# Patient Record
Sex: FEMALE | Race: AMERICAN INDIAN OR ALASKA NATIVE
[De-identification: names, ages, dates, MRNs, and addresses within clinical notes are randomized per-mention and may not be internally consistent; named-entity substitution may affect disease eponyms.]

---

## 2020-02-17 ENCOUNTER — HOSPITAL ENCOUNTER (OUTPATIENT)
Dept: HOSPITAL 5 - PF | Age: 55
Discharge: HOME | End: 2020-02-17
Attending: INTERNAL MEDICINE
Payer: COMMERCIAL

## 2020-02-17 DIAGNOSIS — I10: ICD-10-CM

## 2020-02-17 DIAGNOSIS — M06.9: ICD-10-CM

## 2020-02-17 DIAGNOSIS — E03.9: ICD-10-CM

## 2020-02-17 DIAGNOSIS — J44.9: Primary | ICD-10-CM

## 2020-02-17 DIAGNOSIS — I38: ICD-10-CM

## 2020-02-17 DIAGNOSIS — G62.9: ICD-10-CM

## 2020-02-17 PROCEDURE — 94729 DIFFUSING CAPACITY: CPT

## 2020-02-17 PROCEDURE — 94060 EVALUATION OF WHEEZING: CPT

## 2020-02-17 PROCEDURE — 94640 AIRWAY INHALATION TREATMENT: CPT

## 2020-03-26 ENCOUNTER — HOSPITAL ENCOUNTER (EMERGENCY)
Dept: HOSPITAL 5 - ED | Age: 55
LOS: 1 days | Discharge: HOME | End: 2020-03-27
Payer: SELF-PAY

## 2020-03-26 DIAGNOSIS — J06.9: ICD-10-CM

## 2020-03-26 DIAGNOSIS — Z88.0: ICD-10-CM

## 2020-03-26 DIAGNOSIS — F17.200: ICD-10-CM

## 2020-03-26 DIAGNOSIS — Z91.040: ICD-10-CM

## 2020-03-26 DIAGNOSIS — J45.909: Primary | ICD-10-CM

## 2020-03-26 PROCEDURE — 99283 EMERGENCY DEPT VISIT LOW MDM: CPT

## 2020-03-26 PROCEDURE — 94640 AIRWAY INHALATION TREATMENT: CPT

## 2020-03-26 PROCEDURE — 71046 X-RAY EXAM CHEST 2 VIEWS: CPT

## 2020-03-27 VITALS — SYSTOLIC BLOOD PRESSURE: 128 MMHG | DIASTOLIC BLOOD PRESSURE: 78 MMHG

## 2020-03-27 NOTE — XRAY REPORT
CHEST 2 VIEWS 



INDICATION / CLINICAL INFORMATION:

cough.



COMPARISON: 

None available.



FINDINGS:



SUPPORT DEVICES: None.



HEART / MEDIASTINUM: No significant abnormality. 



LUNGS / PLEURA: No significant pulmonary or pleural abnormality. No pneumothorax. 



ADDITIONAL FINDINGS: No significant additional findings.



IMPRESSION:

1. No acute findings.



Signer Name: JOSAFAT Kessler MD 

Signed: 3/27/2020 1:44 AM

Workstation Name: [id number]

## 2020-03-27 NOTE — EMERGENCY DEPARTMENT REPORT
Minor Respiratory





- HPI


Chief Complaint: Headache


Stated Complaint: SOB,NAUSEA,LIGHT HEADED


Time Seen by Provider: 03/27/20 00:51


Severity: moderate


Minor Respiratory: Yes Able to Tolerate Fluids, Yes Cough, Yes Shortness of 

Breath, No Rhinorrhea, No Sore Throat, No Ear Pain, No Sick Contacts, No 

Hemoptysis, No Chest Pain, No Fever


Other History: This is a 55-year-old female with a history of COPD and asthma 

who presents the ED complaining of intermittent dry and productive coughing for 

the past 2 weeks.  Patient states that symptoms have not gotten better.  Patient

states that she has also been experiencing body aches headaches.  She denies 

fever/chills/nausea vomiting or shortness of breath.  She denies being around 

anyone that is been sick.





ED Review of Systems


ROS: 


Stated complaint: SOB,NAUSEA,LIGHT HEADED


Other details as noted in HPI





Comment: All other systems reviewed and negative





ED Past Medical Hx





- Social History


Smoking Status: Current Every Day Smoker


Substance Use Type: None





- Medications


Home Medications: 


                                Home Medications











 Medication  Instructions  Recorded  Confirmed  Last Taken  Type


 


Acetamin/Codeine 120-12Mg/5 ml 5 ml PO TID #80 ml 03/27/20  Unknown Rx





[Tylenol/Codeine 120-12 mg/5 ml]     


 


Albuterol INH(or & Nicu Only) 2 puff IH QID PRN #8.5 gram 03/27/20  Unknown Rx





[ProAir HFA Inhaler]     


 


Benzonatate [Tessalon Perles] 100 mg PO Q8HR #30 capsule 03/27/20  Unknown Rx














Minor Respiratory Exam





- Exam


General: 


Vital signs noted. No distress. Alert and acting appropriately.





HEENT: Yes Moist Mucous Membranes, No Pharyngeal Erythema, No Pharyngeal 

Exudates, No Rhinorrhea, No Conjuctival Injection, No Frontal Tenderness, No 

Maxillary Tenderness


Ear: Neither TM Bulge, Neither TM Erythema, Neither EAC Pain, Neither EAC 

Discharge


Neck: Yes Supple, No Adenopathy


Lungs: Yes Good Air Exchange, No Wheezes, No Ronchi, No Stridor, No Cough, No 

Labored Respirations, No Retractions, No Use of Accessory Muscles, No Other 

Abnormal Lung Sounds


Heart: Yes Regular, No Murmur


Abdomen: Yes Normal Bowel Sounds, No Tenderness, No Peritoneal Signs


Skin: No Rash, No Edema


Neurologic: 


Alert and oriented, no deficits.








Musculoskeletal: 


Unremarkable.











ED Course


                                   Vital Signs











  03/26/20





  23:51


 


Temperature 98.6 F


 


Pulse Rate 77


 


Respiratory 20





Rate 


 


Blood Pressure 128/78


 


O2 Sat by Pulse 95





Oximetry 














ED Medical Decision Making





- Radiology Data


Radiology results: report reviewed, image reviewed





INDICATION / CLINICAL INFORMATION: 


cough. 





COMPARISON: 


None available. 





FINDINGS: 





SUPPORT DEVICES: None. 





HEART / MEDIASTINUM: No significant abnormality. 





LUNGS / PLEURA: No significant pulmonary or pleural abnormality. No 

pneumothorax. 





ADDITIONAL FINDINGS: No significant additional findings. 





IMPRESSION: 


1. No acute findings. 





Signer Name: JOSAFAT Kessler MD 


Signed: 3/27/2020 1:44 AM 


Workstation Name: [id number] 








 Transcribed By: DT 


 Dictated By: Dalton Kessler MD 


 Electronically Authenticated By: Dalton Kessler MD 


 Signed Date/Time: 03/27/20 0144 











- Medical Decision Making





This 55-year-old female presents with bronchitis.


Patient received breathing treatment and cough suppressant and steroids in the 

ED.


Chest x-ray shows negative for pneumonia or any acute findings.


Critical care attestation.: 


If time is entered above; I have spent that time in minutes in the direct care 

of this critically ill patient, excluding procedure time.








ED Disposition


Clinical Impression: 


 Bronchitis, Upper respiratory infection, Asthmatic bronchitis





Disposition: DC-01 TO HOME OR SELFCARE


Is pt being admited?: No


Does the pt Need Aspirin: No


Condition: Stable


Instructions:  Chronic Bronchitis (ED), Viral Syndrome (ED)


Additional Instructions: 


Make sure to follow up with the primary care physician as discussed.


Take all your medications as you've been prescribed.


If you have any worsening symptoms or develop new symptoms please return to ED 

immediately.


Prescriptions: 


Albuterol INH(or & Nicu Only) [ProAir HFA Inhaler] 2 puff IH QID PRN #8.5 gram


 PRN Reason: Shortness Of Breath


Benzonatate [Tessalon Perles] 100 mg PO Q8HR #30 capsule


Acetamin/Codeine 120-12Mg/5 ml [Tylenol/Codeine 120-12 mg/5 ml] 5 ml PO TID #80 

ml


Referrals: 


PRIMARY CARE,MD [Primary Care Provider] - 3-5 Days


[facility] [Outside] - 3-5 Days


[facility] [Outside] - 3-5 Days


[facility] [Outside] - 3-5 Days


Forms:  Accompanied Note, Work/School Release Form(ED)


Time of Disposition: 02:17